# Patient Record
Sex: FEMALE | Race: WHITE | NOT HISPANIC OR LATINO
[De-identification: names, ages, dates, MRNs, and addresses within clinical notes are randomized per-mention and may not be internally consistent; named-entity substitution may affect disease eponyms.]

---

## 2021-04-23 ENCOUNTER — APPOINTMENT (OUTPATIENT)
Age: 31
End: 2021-04-23
Payer: COMMERCIAL

## 2021-04-23 PROCEDURE — 0011A: CPT

## 2021-05-21 ENCOUNTER — APPOINTMENT (OUTPATIENT)
Age: 31
End: 2021-05-21
Payer: COMMERCIAL

## 2021-05-21 PROCEDURE — 0012A: CPT

## 2022-04-27 PROBLEM — Z00.00 ENCOUNTER FOR PREVENTIVE HEALTH EXAMINATION: Status: ACTIVE | Noted: 2022-04-27

## 2022-04-28 ENCOUNTER — APPOINTMENT (OUTPATIENT)
Dept: CARDIOLOGY | Facility: CLINIC | Age: 32
End: 2022-04-28
Payer: COMMERCIAL

## 2022-04-28 ENCOUNTER — NON-APPOINTMENT (OUTPATIENT)
Age: 32
End: 2022-04-28

## 2022-04-28 VITALS — DIASTOLIC BLOOD PRESSURE: 82 MMHG | SYSTOLIC BLOOD PRESSURE: 124 MMHG

## 2022-04-28 VITALS
DIASTOLIC BLOOD PRESSURE: 82 MMHG | SYSTOLIC BLOOD PRESSURE: 126 MMHG | HEART RATE: 116 BPM | WEIGHT: 185 LBS | OXYGEN SATURATION: 97 % | HEIGHT: 66 IN | BODY MASS INDEX: 29.73 KG/M2

## 2022-04-28 DIAGNOSIS — Z78.9 OTHER SPECIFIED HEALTH STATUS: ICD-10-CM

## 2022-04-28 DIAGNOSIS — Z82.49 FAMILY HISTORY OF ISCHEMIC HEART DISEASE AND OTHER DISEASES OF THE CIRCULATORY SYSTEM: ICD-10-CM

## 2022-04-28 PROCEDURE — 99204 OFFICE O/P NEW MOD 45 MIN: CPT | Mod: 25

## 2022-04-28 PROCEDURE — 93000 ELECTROCARDIOGRAM COMPLETE: CPT

## 2022-04-28 NOTE — DISCUSSION/SUMMARY
[FreeTextEntry1] : 1. Palpitations/Sinus tachycardia: recommend CBC, CMP, TSH. Recommend echocardiogram and 7 day Zio. \par \par Follow up after testing.

## 2022-04-28 NOTE — PHYSICAL EXAM
[No Murmur] : no murmur [Normal] : moves all extremities, no focal deficits, normal speech [de-identified] : regular, tachycardia

## 2022-04-28 NOTE — HISTORY OF PRESENT ILLNESS
[FreeTextEntry1] : 32 year old female, history of anxiety, has been having long standing palpitations. Patient describes it as a racing heart sensation. No chest pain, SOB, or lightheadedness. Patient had a job interview today and thinks that is why her heart rate is elevated. Patient doesn't drink excessive caffeine. She hasn't had recent labs.\par \par There is no history of MI, CVA, CHF, or previous coronary intervention.\par

## 2022-06-25 ENCOUNTER — APPOINTMENT (OUTPATIENT)
Dept: CARDIOLOGY | Facility: CLINIC | Age: 32
End: 2022-06-25
Payer: COMMERCIAL

## 2022-06-25 VITALS
WEIGHT: 190 LBS | DIASTOLIC BLOOD PRESSURE: 82 MMHG | TEMPERATURE: 97.3 F | BODY MASS INDEX: 30.53 KG/M2 | SYSTOLIC BLOOD PRESSURE: 110 MMHG | HEART RATE: 80 BPM | OXYGEN SATURATION: 98 % | HEIGHT: 66 IN

## 2022-06-25 DIAGNOSIS — R00.2 PALPITATIONS: ICD-10-CM

## 2022-06-25 DIAGNOSIS — R53.81 OTHER MALAISE: ICD-10-CM

## 2022-06-25 DIAGNOSIS — R53.83 OTHER MALAISE: ICD-10-CM

## 2022-06-25 PROCEDURE — 99214 OFFICE O/P EST MOD 30 MIN: CPT | Mod: 25

## 2022-06-25 PROCEDURE — 93306 TTE W/DOPPLER COMPLETE: CPT | Mod: 1L

## 2022-06-25 NOTE — HISTORY OF PRESENT ILLNESS
[FreeTextEntry1] : Historical Perspective:\par 32 year old female, history of anxiety, has been having long standing palpitations. Patient describes it as a racing heart sensation. No chest pain, SOB, or lightheadedness. Patient had a job interview today and thinks that is why her heart rate is elevated. Patient doesn't drink excessive caffeine. She hasn't had recent labs.\par \par There is no history of MI, CVA, CHF, or previous coronary intervention.\par \par Current Health Status:\par Patient forgot to mention last time that she does snore heavily. Wakes up not fully rested and has daytime fatigue. \par

## 2022-06-25 NOTE — PHYSICAL EXAM
[No Murmur] : no murmur [Normal] : moves all extremities, no focal deficits, normal speech [de-identified] : regular, tachycardia

## 2022-06-25 NOTE — CARDIOLOGY SUMMARY
[de-identified] : 4/28/2022, Sinus tachycardia [de-identified] : 4/28/2022, 7 Day Zio, NSR-ST with rare ectopy. [de-identified] : 6/25/2022, LV EF 55-60%, trace MR

## 2022-06-25 NOTE — DISCUSSION/SUMMARY
[FreeTextEntry1] : 1. Palpitations/Sinus tachycardia: labs normal, Echocardiogram normal. 7 Day Zio showed NSR-ST at times. Rare ectopy. Given the snoring/daytime fatigue, recommend home sleep study to screen for VALERY. \par \par Office will call with results. \par \par Follow up in 1 year.

## 2022-07-18 ENCOUNTER — OUTPATIENT (OUTPATIENT)
Dept: OUTPATIENT SERVICES | Facility: HOSPITAL | Age: 32
LOS: 1 days | End: 2022-07-18
Payer: COMMERCIAL

## 2022-07-18 DIAGNOSIS — G47.33 OBSTRUCTIVE SLEEP APNEA (ADULT) (PEDIATRIC): ICD-10-CM

## 2022-07-18 PROCEDURE — 95800 SLP STDY UNATTENDED: CPT

## 2022-07-26 ENCOUNTER — NON-APPOINTMENT (OUTPATIENT)
Age: 32
End: 2022-07-26

## 2022-08-02 ENCOUNTER — APPOINTMENT (OUTPATIENT)
Dept: PULMONOLOGY | Facility: CLINIC | Age: 32
End: 2022-08-02

## 2022-08-02 VITALS
OXYGEN SATURATION: 99 % | RESPIRATION RATE: 14 BRPM | WEIGHT: 185 LBS | HEIGHT: 66 IN | BODY MASS INDEX: 29.73 KG/M2 | HEART RATE: 76 BPM | DIASTOLIC BLOOD PRESSURE: 82 MMHG | SYSTOLIC BLOOD PRESSURE: 132 MMHG

## 2022-08-02 DIAGNOSIS — R06.83 SNORING: ICD-10-CM

## 2022-08-02 DIAGNOSIS — G47.10 HYPERSOMNIA, UNSPECIFIED: ICD-10-CM

## 2022-08-02 DIAGNOSIS — G47.419 NARCOLEPSY W/OUT CATAPLEXY: ICD-10-CM

## 2022-08-02 DIAGNOSIS — R00.0 TACHYCARDIA, UNSPECIFIED: ICD-10-CM

## 2022-08-02 DIAGNOSIS — Z87.42 PERSONAL HISTORY OF OTHER DISEASES OF THE FEMALE GENITAL TRACT: ICD-10-CM

## 2022-08-02 DIAGNOSIS — G25.81 RESTLESS LEGS SYNDROME: ICD-10-CM

## 2022-08-02 DIAGNOSIS — G47.33 OBSTRUCTIVE SLEEP APNEA (ADULT) (PEDIATRIC): ICD-10-CM

## 2022-08-02 PROCEDURE — 99204 OFFICE O/P NEW MOD 45 MIN: CPT

## 2024-08-10 ENCOUNTER — NON-APPOINTMENT (OUTPATIENT)
Age: 34
End: 2024-08-10

## 2024-08-25 ENCOUNTER — RESULT CHARGE (OUTPATIENT)
Age: 34
End: 2024-08-25

## 2024-08-26 ENCOUNTER — APPOINTMENT (OUTPATIENT)
Dept: CARDIOLOGY | Facility: CLINIC | Age: 34
End: 2024-08-26
Payer: COMMERCIAL

## 2024-08-26 ENCOUNTER — NON-APPOINTMENT (OUTPATIENT)
Age: 34
End: 2024-08-26

## 2024-08-26 VITALS
OXYGEN SATURATION: 99 % | HEART RATE: 91 BPM | HEIGHT: 66 IN | WEIGHT: 195 LBS | DIASTOLIC BLOOD PRESSURE: 84 MMHG | RESPIRATION RATE: 14 BRPM | BODY MASS INDEX: 31.34 KG/M2 | SYSTOLIC BLOOD PRESSURE: 130 MMHG

## 2024-08-26 VITALS — SYSTOLIC BLOOD PRESSURE: 120 MMHG | DIASTOLIC BLOOD PRESSURE: 80 MMHG

## 2024-08-26 DIAGNOSIS — R53.83 OTHER MALAISE: ICD-10-CM

## 2024-08-26 DIAGNOSIS — E78.49 OTHER HYPERLIPIDEMIA: ICD-10-CM

## 2024-08-26 DIAGNOSIS — R53.81 OTHER MALAISE: ICD-10-CM

## 2024-08-26 DIAGNOSIS — G47.33 OBSTRUCTIVE SLEEP APNEA (ADULT) (PEDIATRIC): ICD-10-CM

## 2024-08-26 DIAGNOSIS — R00.2 PALPITATIONS: ICD-10-CM

## 2024-08-26 DIAGNOSIS — R00.0 TACHYCARDIA, UNSPECIFIED: ICD-10-CM

## 2024-08-26 DIAGNOSIS — E66.09 OTHER OBESITY DUE TO EXCESS CALORIES: ICD-10-CM

## 2024-08-26 PROCEDURE — 93000 ELECTROCARDIOGRAM COMPLETE: CPT

## 2024-08-26 PROCEDURE — 99214 OFFICE O/P EST MOD 30 MIN: CPT

## 2024-08-26 RX ORDER — VENLAFAXINE 37.5 MG/1
37.5 TABLET ORAL DAILY
Refills: 0 | Status: ACTIVE | COMMUNITY

## 2024-08-26 NOTE — DISCUSSION/SUMMARY
[FreeTextEntry1] : CORA CHAVIRA is a 34 year old F who presents today Aug 26, 2024 with the above history and the following active issues:   1. Palpitations/Sinus tachycardia: labs normal, Echocardiogram normal. 7 Day Zio showed NSR-ST at times. Rare ectopy. Given the snoring/daytime fatigue, recommend home sleep study to screen for VALERY.

## 2024-08-28 NOTE — HISTORY OF PRESENT ILLNESS
[FreeTextEntry1] : Historical Perspective: 34 year old female, history of anxiety, has been having long standing palpitations. Hx PCOS.   There is no history of MI, CVA, CHF, or previous coronary intervention.  She does snore heavily. Wakes up not fully rested and has daytime fatigue.  Seen by sleep specialist. Declines txt for VALERY.   She has no new symptoms. She came today asking for rx for weight loss. She has been paying OOP for GLP1a from [facility]. She has had no side effects on low dosing but can no longer afford it. Has had minimal weight loss.  Unable to lose weight despite at least 6 months of dietary changes and lifestyle modification measures.  BP is borderline today.  No exercise.  She denies chest pain, pressure, palpitations, unusual shortness of breath, orthopnea, LE edema, lightheadedness, dizziness, near syncope or syncope.  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   Last labs 11/2023 , HDL 36, , a1c 5.6, TFT wnl  EKG 8/26/24 normal sinus rhythm

## 2024-08-28 NOTE — ASSESSMENT
[FreeTextEntry1] : CORA CHAVIRA is a 34 year old F who presents today Aug 26, 2024 with the above history and the following active issues:   Prior hx Palpitations/Sinus tachycardia: labs normal, Echocardiogram normal. 7 Day Zio showed NSR-ST at times. Rare ectopy. Given the snoring/daytime fatigue, seen by sleep specialist but declines txt for VALERY. Weight loss would be important to reduce her risk and txt this.  Also hx PCOS likely influenced by her weight.   Obesity + significant CRF (VALERY, PCOS, HLD, high BP today) Pt heading towards metabolic syndrome.  Recommend weight loss to reduce CV risk, unable to lose on her own. Recommend GLP.  The cardiovascular benefits of GLP-1 agonists were discussed as well as the possible side effects, including injection site reaction, upset stomach, nausea, vomiting, diarrhea, and weight loss, as well as dizziness and headaches. Pancreatitis and kidney failure history were reviewed since caution should be taken in patients with a history of either condition. Patient denies a personal or family history of medullary thyroid cancer or MEN2.  Discussed potential GI side effects which will likely subside with time but should call us right away if severe or not resolving on their own. Adjunctive dietary and lifestyle modification measures have been reviewed.  Call for uptitration if approved. Can use GLP or GLP/GIP. Would see her back in office 3-4 mo after initiation.  Any questions and concerns were addressed and resolved.   Sincerely,  SARIKA Sorenson Patients history, testing, and plan reviewed with supervising MD: Dr. Patrick Valentino

## 2024-08-28 NOTE — REVIEW OF SYSTEMS
[Negative] : Neurological [Weight Gain (___ Lbs)] : [unfilled] ~Ulb weight gain [FreeTextEntry5] : see HPI [de-identified] : see HPI

## 2024-08-28 NOTE — PHYSICAL EXAM
[No Murmur] : no murmur [Normal] : moves all extremities, no focal deficits, normal speech [de-identified] : regular, tachycardia

## 2024-08-28 NOTE — PHYSICAL EXAM
[No Murmur] : no murmur [Normal] : moves all extremities, no focal deficits, normal speech [de-identified] : regular, tachycardia

## 2024-08-28 NOTE — REVIEW OF SYSTEMS
[Negative] : Neurological [Weight Gain (___ Lbs)] : [unfilled] ~Ulb weight gain [FreeTextEntry5] : see HPI [de-identified] : see HPI

## 2024-08-28 NOTE — CARDIOLOGY SUMMARY
[de-identified] : 4/28/2022, Sinus tachycardia 8/26/24:  [de-identified] : 4/28/2022, 7 Day Zio, NSR-ST with rare ectopy. [de-identified] : 6/25/2022, LV EF 55-60%, trace MR

## 2024-08-28 NOTE — HISTORY OF PRESENT ILLNESS
[FreeTextEntry1] : Historical Perspective: 34 year old female, history of anxiety, has been having long standing palpitations. Hx PCOS.   There is no history of MI, CVA, CHF, or previous coronary intervention.  She does snore heavily. Wakes up not fully rested and has daytime fatigue.  Seen by sleep specialist. Declines txt for VALERY.   She has no new symptoms. She came today asking for rx for weight loss. She has been paying OOP for GLP1a from [facility]. She has had no side effects on low dosing but can no longer afford it. Has had minimal weight loss.  Unable to lose weight despite at least 6 months of dietary changes and lifestyle modification measures.  BP is borderline today.  No exercise.  She denies chest pain, pressure, palpitations, unusual shortness of breath, orthopnea, LE edema, lightheadedness, dizziness, near syncope or syncope.  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   Last labs 11/2023 , HDL 36, , a1c 5.6, TFT wnl  EKG 8/26/24 normal sinus rhythm

## 2024-08-28 NOTE — CARDIOLOGY SUMMARY
[de-identified] : 4/28/2022, Sinus tachycardia 8/26/24:  [de-identified] : 4/28/2022, 7 Day Zio, NSR-ST with rare ectopy. [de-identified] : 6/25/2022, LV EF 55-60%, trace MR

## 2024-09-03 RX ORDER — TIRZEPATIDE 2.5 MG/.5ML
2.5 INJECTION, SOLUTION SUBCUTANEOUS
Qty: 1 | Refills: 0 | Status: ACTIVE | COMMUNITY
Start: 2024-08-26

## 2024-09-25 ENCOUNTER — NON-APPOINTMENT (OUTPATIENT)
Age: 34
End: 2024-09-25

## 2024-10-09 ENCOUNTER — NON-APPOINTMENT (OUTPATIENT)
Age: 34
End: 2024-10-09

## 2024-10-16 ENCOUNTER — APPOINTMENT (OUTPATIENT)
Dept: CARDIOLOGY | Facility: CLINIC | Age: 34
End: 2024-10-16
Payer: COMMERCIAL

## 2024-10-16 VITALS
HEIGHT: 66 IN | HEART RATE: 79 BPM | WEIGHT: 185 LBS | BODY MASS INDEX: 29.73 KG/M2 | SYSTOLIC BLOOD PRESSURE: 112 MMHG | OXYGEN SATURATION: 96 % | DIASTOLIC BLOOD PRESSURE: 72 MMHG

## 2024-10-16 DIAGNOSIS — E66.09 OBESITY, CLASS 1: ICD-10-CM

## 2024-10-16 DIAGNOSIS — G47.33 OBSTRUCTIVE SLEEP APNEA (ADULT) (PEDIATRIC): ICD-10-CM

## 2024-10-16 DIAGNOSIS — E66.811 OBESITY, CLASS 1: ICD-10-CM

## 2024-10-16 DIAGNOSIS — E78.49 OTHER HYPERLIPIDEMIA: ICD-10-CM

## 2024-10-16 PROCEDURE — 99213 OFFICE O/P EST LOW 20 MIN: CPT

## 2024-10-16 PROCEDURE — 99203 OFFICE O/P NEW LOW 30 MIN: CPT

## 2024-12-06 ENCOUNTER — APPOINTMENT (OUTPATIENT)
Dept: CARDIOLOGY | Facility: CLINIC | Age: 34
End: 2024-12-06

## 2025-01-17 ENCOUNTER — APPOINTMENT (OUTPATIENT)
Dept: CARDIOLOGY | Facility: CLINIC | Age: 35
End: 2025-01-17
Payer: COMMERCIAL

## 2025-01-17 VITALS
HEIGHT: 66 IN | SYSTOLIC BLOOD PRESSURE: 120 MMHG | WEIGHT: 165 LBS | OXYGEN SATURATION: 97 % | BODY MASS INDEX: 26.52 KG/M2 | HEART RATE: 81 BPM | DIASTOLIC BLOOD PRESSURE: 70 MMHG

## 2025-01-17 DIAGNOSIS — E66.811 OBESITY, CLASS 1: ICD-10-CM

## 2025-01-17 DIAGNOSIS — R00.2 PALPITATIONS: ICD-10-CM

## 2025-01-17 DIAGNOSIS — R53.81 OTHER MALAISE: ICD-10-CM

## 2025-01-17 DIAGNOSIS — E78.49 OTHER HYPERLIPIDEMIA: ICD-10-CM

## 2025-01-17 DIAGNOSIS — R53.83 OTHER MALAISE: ICD-10-CM

## 2025-01-17 DIAGNOSIS — E66.09 OBESITY, CLASS 1: ICD-10-CM

## 2025-01-17 DIAGNOSIS — G47.33 OBSTRUCTIVE SLEEP APNEA (ADULT) (PEDIATRIC): ICD-10-CM

## 2025-01-17 PROCEDURE — 99214 OFFICE O/P EST MOD 30 MIN: CPT

## 2025-04-18 ENCOUNTER — APPOINTMENT (OUTPATIENT)
Dept: CARDIOLOGY | Facility: CLINIC | Age: 35
End: 2025-04-18

## 2025-04-24 ENCOUNTER — NON-APPOINTMENT (OUTPATIENT)
Age: 35
End: 2025-04-24

## 2025-06-09 ENCOUNTER — APPOINTMENT (OUTPATIENT)
Dept: CARDIOLOGY | Facility: CLINIC | Age: 35
End: 2025-06-09

## 2025-06-17 ENCOUNTER — APPOINTMENT (OUTPATIENT)
Dept: CARDIOLOGY | Facility: CLINIC | Age: 35
End: 2025-06-17

## 2025-08-01 ENCOUNTER — LABORATORY RESULT (OUTPATIENT)
Age: 35
End: 2025-08-01

## 2025-08-05 ENCOUNTER — APPOINTMENT (OUTPATIENT)
Dept: CARDIOLOGY | Facility: CLINIC | Age: 35
End: 2025-08-05
Payer: COMMERCIAL

## 2025-08-05 VITALS
DIASTOLIC BLOOD PRESSURE: 82 MMHG | SYSTOLIC BLOOD PRESSURE: 110 MMHG | BODY MASS INDEX: 24.21 KG/M2 | OXYGEN SATURATION: 99 % | WEIGHT: 150 LBS | HEART RATE: 85 BPM

## 2025-08-05 DIAGNOSIS — E87.5 HYPERKALEMIA: ICD-10-CM

## 2025-08-05 DIAGNOSIS — R00.2 PALPITATIONS: ICD-10-CM

## 2025-08-05 DIAGNOSIS — R53.81 OTHER MALAISE: ICD-10-CM

## 2025-08-05 DIAGNOSIS — R53.83 OTHER MALAISE: ICD-10-CM

## 2025-08-05 DIAGNOSIS — E66.09 OBESITY, CLASS 1: ICD-10-CM

## 2025-08-05 DIAGNOSIS — E78.49 OTHER HYPERLIPIDEMIA: ICD-10-CM

## 2025-08-05 DIAGNOSIS — E66.811 OBESITY, CLASS 1: ICD-10-CM

## 2025-08-05 LAB — POTASSIUM SERPL-SCNC: 5.6 MMOL/L

## 2025-08-05 PROCEDURE — 99214 OFFICE O/P EST MOD 30 MIN: CPT

## 2025-08-05 PROCEDURE — 93000 ELECTROCARDIOGRAM COMPLETE: CPT

## 2025-09-06 ENCOUNTER — NON-APPOINTMENT (OUTPATIENT)
Age: 35
End: 2025-09-06

## 2025-09-11 ENCOUNTER — NON-APPOINTMENT (OUTPATIENT)
Age: 35
End: 2025-09-11